# Patient Record
Sex: FEMALE | Race: WHITE | NOT HISPANIC OR LATINO
[De-identification: names, ages, dates, MRNs, and addresses within clinical notes are randomized per-mention and may not be internally consistent; named-entity substitution may affect disease eponyms.]

---

## 2021-10-14 PROBLEM — Z00.00 ENCOUNTER FOR PREVENTIVE HEALTH EXAMINATION: Status: ACTIVE | Noted: 2021-10-14

## 2021-10-19 ENCOUNTER — NON-APPOINTMENT (OUTPATIENT)
Age: 31
End: 2021-10-19

## 2021-10-19 ENCOUNTER — APPOINTMENT (OUTPATIENT)
Dept: COLORECTAL SURGERY | Facility: CLINIC | Age: 31
End: 2021-10-19
Payer: COMMERCIAL

## 2021-10-19 VITALS
SYSTOLIC BLOOD PRESSURE: 114 MMHG | DIASTOLIC BLOOD PRESSURE: 77 MMHG | HEART RATE: 58 BPM | TEMPERATURE: 98.4 F | HEIGHT: 64 IN | WEIGHT: 148 LBS | BODY MASS INDEX: 25.27 KG/M2

## 2021-10-19 DIAGNOSIS — Z86.718 PERSONAL HISTORY OF OTHER VENOUS THROMBOSIS AND EMBOLISM: ICD-10-CM

## 2021-10-19 DIAGNOSIS — Q27.9 CONGENITAL MALFORMATION OF PERIPHERAL VASCULAR SYSTEM, UNSPECIFIED: ICD-10-CM

## 2021-10-19 DIAGNOSIS — Z86.2 PERSONAL HISTORY OF DISEASES OF THE BLOOD AND BLOOD-FORMING ORGANS AND CERTAIN DISORDERS INVOLVING THE IMMUNE MECHANISM: ICD-10-CM

## 2021-10-19 DIAGNOSIS — Z72.89 OTHER PROBLEMS RELATED TO LIFESTYLE: ICD-10-CM

## 2021-10-19 DIAGNOSIS — Z78.9 OTHER SPECIFIED HEALTH STATUS: ICD-10-CM

## 2021-10-19 DIAGNOSIS — Z83.438 FAMILY HISTORY OF OTHER DISORDER OF LIPOPROTEIN METABOLISM AND OTHER LIPIDEMIA: ICD-10-CM

## 2021-10-19 PROCEDURE — 99203 OFFICE O/P NEW LOW 30 MIN: CPT | Mod: 25

## 2021-10-19 PROCEDURE — 46600 DIAGNOSTIC ANOSCOPY SPX: CPT

## 2021-10-19 NOTE — HISTORY OF PRESENT ILLNESS
[FreeTextEntry1] : 32 yo F presents for evaluation of possible fistula, referred by Dr. 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 Cristhian [facility] (advised to take ASA 81 mg as needed during travel)\par PSH tonsillectomy, wisdom tooth extraction\par \par h/o constipation which she manages w/ dietary fiber\par c/o sharp pain w/ defecation which began 2-3 months ago in setting of constipation and worsened w/ sitting\par Also associated w/ BRB noted on TP w/ most BM\par Feels like there's a cut\par Pt felt a possible bump approx 5 weeks ago. Denies fever, body aches or chills at this time. Unsure if felt wetness or normal vaginal discharge.\par Seen by Dr. Stanley one month ago who noted a possible fold in perineal area and referred to this office for further evaluation.\par Pt was busy with moving from [place] to [place] at the time\par She continues to have pain during BMs or if touches with TP. Some stinging discomfort w/ prolonged sitting\par Occasional itching\par Denies prior imaging or use of any topical medication or suppositories\par \par BH: 2-3 times daily, denies constipation at present\par Reports adequate dietary fiber and water intake\par Denies stool softeners or fiber supplementation\par \par Never had a colonoscopy\par Denies FMH CRC or IBD\par Last took ASA 81 mg approx 2-3 weeks ago

## 2021-10-19 NOTE — ASSESSMENT
[FreeTextEntry1] : Exam findings and diagnosis were discussed at length with patient. \par Recommendations including increased fiber intake, adequate daily hydration, stool softeners as needed, and sitz baths as needed and after bowel movements were discussed.\par Medical management, such diltiazem cream TID, was discussed. 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

## 2021-10-19 NOTE — PHYSICAL EXAM
[FreeTextEntry1] : Medical assistant present for duration of physical examination\par \par General no acute distress, alert and oriented\par Psych calm, pleasant demeanor, responding appropriately to questions\par Nonlabored breathing\par Ambulating without assistance\par Skin normal color and pigment, no visible lesions or rashes\par \par \par Anorectal Exam:\par Inspection no erythema, induration or fluctuance, perianal skin excoriations with mild edema, anterior midline anal fissure, no external hemorrhoidal inflammation or thrombosis\par RICKIE nontender, no masses palpated, no blood on gloved finger\par \par Procedure: Anoscopy\par \par Pre procedure Diagnosis: anal fissure\par Post procedure Diagnosis: anal fissure\par Anesthesia: none\par Estimated blood loss: none\par Specimen: none\par Complications: none\par \par Consent obtained. Anoscopy was performed by passing a lighted anoscope with lubricant jelly into the anal canal and the entire anal mucosal surface was inspected. Findings included anal fissure seen at anterior midline anal verge, no internal hemorrhoidal inflammation, no discharge in anal canal, no mucosal lesions or abnormalities visualized \par \par Patient tolerated examination and procedure well.\par \par \par

## 2021-12-09 ENCOUNTER — APPOINTMENT (OUTPATIENT)
Dept: COLORECTAL SURGERY | Facility: CLINIC | Age: 31
End: 2021-12-09
Payer: COMMERCIAL

## 2021-12-09 VITALS
SYSTOLIC BLOOD PRESSURE: 115 MMHG | BODY MASS INDEX: 24.92 KG/M2 | DIASTOLIC BLOOD PRESSURE: 72 MMHG | HEIGHT: 64 IN | TEMPERATURE: 97.3 F | HEART RATE: 70 BPM | WEIGHT: 146 LBS

## 2021-12-09 DIAGNOSIS — K60.2 ANAL FISSURE, UNSPECIFIED: ICD-10-CM

## 2021-12-09 PROCEDURE — 46600 DIAGNOSTIC ANOSCOPY SPX: CPT

## 2021-12-09 RX ORDER — ASPIRIN 81 MG
81 TABLET, DELAYED RELEASE (ENTERIC COATED) ORAL
Refills: 0 | Status: ACTIVE | COMMUNITY

## 2021-12-09 RX ORDER — LEVONORGESTREL 52 MG/1
INTRAUTERINE DEVICE INTRAUTERINE
Refills: 0 | Status: ACTIVE | COMMUNITY

## 2021-12-09 NOTE — PHYSICAL EXAM
[FreeTextEntry1] : Medical assistant present for duration of physical examination\par \par General no acute distress, alert and oriented\par Psych calm, pleasant, in good spirits\par Nonlabored breathing\par Ambulating without assistance\par Skin normal color and pigment, no visible lesions or rashes\par \par \par Anorectal Exam:\par Inspection previously perianal skin excoriations  have resolved and anterior midline anal fissure has healed\par RICKIE nontender, no masses palpated, no blood on gloved finger, no stenosis, normal tone at rest\par \par Procedure: Anoscopy\par \par Pre procedure Diagnosis: anal fissure\par Post procedure Diagnosis: anal fissure\par Anesthesia: none\par Estimated blood loss: none\par Specimen: none\par Complications: none\par \par Consent obtained. Anoscopy was performed by passing a lighted anoscope with lubricant jelly into the anal canal and the entire anal mucosal surface was inspected. Findings included no anal fissure, no internal hemorrhoidal inflammation\par \par Patient tolerated examination and procedure well.\par \par \par

## 2021-12-09 NOTE — HISTORY OF PRESENT ILLNESS
[FreeTextEntry1] : 32 y/o F presents for f/u anal fissure\par \par Seen in the office on 10/19/21 for initial consultation. Anal fissure at the anterior midline anal verge noted. Patient started on Diltiazem ointment TID,using 1-2 times daily for the past 6 weeks\par \par Denies pain, itching or bleeding\par States the anus is "rigid" and makes BM's more difficult, medication "worked too well". Reports external component of fissure is healed \par Never did a sitz bath\par BH:every other day\par Denies constipation or diarrhea. Admits to straining\par No use of stool softeners,fiber supplements or laxatives\par Working on dietary fiber intake. Currently drinking 8 cups of water\par Never had a colonoscopy\par Denies FMH of IBD or colorectal cancer\par No ASA/NSAIDs last 7 days

## 2021-12-09 NOTE — ASSESSMENT
[FreeTextEntry1] : Exam findings were discussed at length with patient.\par Patient reassured, fissure resolved with supportive care.\par F/u as needed if symptoms return or if new symptoms arise.\par All questions were answered, patient expressed understanding, and is agreeable to this plan.\par